# Patient Record
Sex: MALE | Race: AMERICAN INDIAN OR ALASKA NATIVE | URBAN - NONMETROPOLITAN AREA
[De-identification: names, ages, dates, MRNs, and addresses within clinical notes are randomized per-mention and may not be internally consistent; named-entity substitution may affect disease eponyms.]

---

## 2017-01-27 ENCOUNTER — COMMUNICATION - GICH (OUTPATIENT)
Dept: PEDIATRICS | Facility: OTHER | Age: 52
End: 2017-01-27

## 2017-01-27 DIAGNOSIS — M86.272 SUBACUTE OSTEOMYELITIS, LEFT ANKLE AND FOOT (H): ICD-10-CM

## 2017-02-22 ENCOUNTER — COMMUNICATION - GICH (OUTPATIENT)
Dept: PEDIATRICS | Facility: OTHER | Age: 52
End: 2017-02-22

## 2017-02-22 DIAGNOSIS — E11.8 TYPE 2 DIABETES MELLITUS WITH COMPLICATIONS (H): ICD-10-CM

## 2017-02-22 DIAGNOSIS — E11.65 TYPE 2 DIABETES MELLITUS WITH HYPERGLYCEMIA (H): ICD-10-CM

## 2017-03-06 ENCOUNTER — COMMUNICATION - GICH (OUTPATIENT)
Dept: INTERNAL MEDICINE | Facility: OTHER | Age: 52
End: 2017-03-06

## 2017-03-06 DIAGNOSIS — F51.04 PSYCHOPHYSIOLOGIC INSOMNIA: ICD-10-CM

## 2017-03-13 ENCOUNTER — COMMUNICATION - GICH (OUTPATIENT)
Dept: INTERNAL MEDICINE | Facility: OTHER | Age: 52
End: 2017-03-13

## 2017-03-13 DIAGNOSIS — F51.04 PSYCHOPHYSIOLOGIC INSOMNIA: ICD-10-CM

## 2017-03-17 ENCOUNTER — COMMUNICATION - GICH (OUTPATIENT)
Dept: INTERNAL MEDICINE | Facility: OTHER | Age: 52
End: 2017-03-17

## 2017-03-20 ENCOUNTER — COMMUNICATION - GICH (OUTPATIENT)
Dept: INTERNAL MEDICINE | Facility: OTHER | Age: 52
End: 2017-03-20

## 2017-03-20 DIAGNOSIS — F51.04 PSYCHOPHYSIOLOGIC INSOMNIA: ICD-10-CM

## 2017-03-25 ENCOUNTER — COMMUNICATION - GICH (OUTPATIENT)
Dept: PEDIATRICS | Facility: OTHER | Age: 52
End: 2017-03-25

## 2017-03-27 ENCOUNTER — COMMUNICATION - GICH (OUTPATIENT)
Dept: INTERNAL MEDICINE | Facility: OTHER | Age: 52
End: 2017-03-27

## 2017-03-27 DIAGNOSIS — F51.04 PSYCHOPHYSIOLOGIC INSOMNIA: ICD-10-CM

## 2017-03-31 ENCOUNTER — COMMUNICATION - GICH (OUTPATIENT)
Dept: INTERNAL MEDICINE | Facility: OTHER | Age: 52
End: 2017-03-31

## 2017-04-06 ENCOUNTER — COMMUNICATION - GICH (OUTPATIENT)
Dept: INTERNAL MEDICINE | Facility: OTHER | Age: 52
End: 2017-04-06

## 2017-04-06 DIAGNOSIS — F51.04 PSYCHOPHYSIOLOGIC INSOMNIA: ICD-10-CM

## 2017-05-10 ENCOUNTER — COMMUNICATION - GICH (OUTPATIENT)
Dept: INTERNAL MEDICINE | Facility: OTHER | Age: 52
End: 2017-05-10

## 2017-05-10 DIAGNOSIS — M86.272 SUBACUTE OSTEOMYELITIS, LEFT ANKLE AND FOOT (H): ICD-10-CM

## 2017-05-18 ENCOUNTER — COMMUNICATION - GICH (OUTPATIENT)
Dept: PEDIATRICS | Facility: OTHER | Age: 52
End: 2017-05-18

## 2017-05-18 DIAGNOSIS — E11.65 TYPE 2 DIABETES MELLITUS WITH HYPERGLYCEMIA (H): ICD-10-CM

## 2017-05-18 DIAGNOSIS — E11.8 TYPE 2 DIABETES MELLITUS WITH COMPLICATIONS (H): ICD-10-CM

## 2017-05-24 ENCOUNTER — HISTORY (OUTPATIENT)
Dept: PEDIATRICS | Facility: OTHER | Age: 52
End: 2017-05-24

## 2017-05-24 ENCOUNTER — OFFICE VISIT - GICH (OUTPATIENT)
Dept: PEDIATRICS | Facility: OTHER | Age: 52
End: 2017-05-24

## 2017-05-24 DIAGNOSIS — F19.90 OTHER PSYCHOACTIVE SUBSTANCE USE, UNSPECIFIED, UNCOMPLICATED: ICD-10-CM

## 2017-05-24 DIAGNOSIS — M86.272 SUBACUTE OSTEOMYELITIS, LEFT ANKLE AND FOOT (H): ICD-10-CM

## 2017-05-24 DIAGNOSIS — R45.89 OTHER SYMPTOMS AND SIGNS INVOLVING EMOTIONAL STATE: ICD-10-CM

## 2017-05-24 DIAGNOSIS — E11.65 TYPE 2 DIABETES MELLITUS WITH HYPERGLYCEMIA (H): ICD-10-CM

## 2017-05-24 DIAGNOSIS — Z79.4 LONG TERM CURRENT USE OF INSULIN (H): ICD-10-CM

## 2017-05-24 DIAGNOSIS — I10 ESSENTIAL (PRIMARY) HYPERTENSION: ICD-10-CM

## 2017-05-24 DIAGNOSIS — S98.212S: ICD-10-CM

## 2017-05-24 DIAGNOSIS — F51.04 PSYCHOPHYSIOLOGIC INSOMNIA: ICD-10-CM

## 2017-05-24 DIAGNOSIS — E78.2 MIXED HYPERLIPIDEMIA: ICD-10-CM

## 2017-05-24 DIAGNOSIS — F17.200 NICOTINE DEPENDENCE, UNCOMPLICATED: ICD-10-CM

## 2017-05-24 DIAGNOSIS — E11.8 TYPE 2 DIABETES MELLITUS WITH COMPLICATIONS (H): ICD-10-CM

## 2017-05-24 LAB
ANION GAP - HISTORICAL: 6 (ref 5–18)
BUN SERPL-MCNC: 19 MG/DL (ref 7–25)
BUN/CREAT RATIO - HISTORICAL: 22
CALCIUM SERPL-MCNC: 9.4 MG/DL (ref 8.6–10.3)
CHLORIDE SERPLBLD-SCNC: 99 MMOL/L (ref 98–107)
CHOL/HDL RATIO - HISTORICAL: 4.06
CHOLESTEROL TOTAL: 191 MG/DL
CO2 SERPL-SCNC: 25 MMOL/L (ref 21–31)
CREAT SERPL-MCNC: 0.88 MG/DL (ref 0.7–1.3)
ESTIMATED AVERAGE GLUCOSE: 324 MG/DL
GFR IF NOT AFRICAN AMERICAN - HISTORICAL: >60 ML/MIN/1.73M2
GLUCOSE SERPL-MCNC: 329 MG/DL (ref 70–105)
HDLC SERPL-MCNC: 47 MG/DL (ref 23–92)
HEMOGLOBIN A1C MONITORING (POCT) - HISTORICAL: 12.9 % (ref 4–6.2)
LDLC SERPL CALC-MCNC: 116 MG/DL
NON-HDL CHOLESTEROL - HISTORICAL: 144 MG/DL
PATIENT STATUS - HISTORICAL: ABNORMAL
POTASSIUM SERPL-SCNC: 4.6 MMOL/L (ref 3.5–5.1)
SODIUM SERPL-SCNC: 130 MMOL/L (ref 133–143)
TRIGL SERPL-MCNC: 139 MG/DL

## 2017-05-24 ASSESSMENT — PATIENT HEALTH QUESTIONNAIRE - PHQ9: SUM OF ALL RESPONSES TO PHQ QUESTIONS 1-9: 0

## 2017-05-24 ASSESSMENT — ANXIETY QUESTIONNAIRES
1. FEELING NERVOUS, ANXIOUS, OR ON EDGE: NOT AT ALL
GAD7 TOTAL SCORE: 0
3. WORRYING TOO MUCH ABOUT DIFFERENT THINGS: NOT AT ALL
5. BEING SO RESTLESS THAT IT IS HARD TO SIT STILL: NOT AT ALL
7. FEELING AFRAID AS IF SOMETHING AWFUL MIGHT HAPPEN: NOT AT ALL
4. TROUBLE RELAXING: NOT AT ALL
6. BECOMING EASILY ANNOYED OR IRRITABLE: NOT AT ALL
2. NOT BEING ABLE TO STOP OR CONTROL WORRYING: NOT AT ALL

## 2017-05-30 ENCOUNTER — COMMUNICATION - GICH (OUTPATIENT)
Dept: INTERNAL MEDICINE | Facility: OTHER | Age: 52
End: 2017-05-30

## 2017-05-31 ENCOUNTER — AMBULATORY - GICH (OUTPATIENT)
Dept: PEDIATRICS | Facility: OTHER | Age: 52
End: 2017-05-31

## 2017-05-31 LAB
6-MONOACETYL MORPHINE - HISTORICAL: ABNORMAL NG/ML
AMPHETAMINE URINE - HISTORICAL: ABNORMAL NG/ML
BARBITURATE URINE - HISTORICAL: ABNORMAL NG/ML
BENZODIAZEPINE URINE - HISTORICAL: ABNORMAL NG/ML
BUPRENORPHRINE URINE - HISTORICAL: ABNORMAL NG/ML
COCAINE METAB URINE - HISTORICAL: ABNORMAL NG/ML
CREAT UR - HISTORICAL: 68 MG/DL
ETHYLGLUCURONIDE URINE - HISTORICAL: ABNORMAL NG/ML
FENTANYL URINE - HISTORICAL: ABNORMAL NG/ML
MASS SPECTROMETRY URINE - HISTORICAL: ABNORMAL
METHADONE URINE - HISTORICAL: ABNORMAL NG/ML
OPIATES URINE - HISTORICAL: ABNORMAL NG/ML
OXYCODONE URINE - HISTORICAL: ABNORMAL NG/ML
PH URINE - HISTORICAL: 7.3
PROPOXYPHENE URINE - HISTORICAL: ABNORMAL NG/ML
THC 50 URINE - HISTORICAL: ABNORMAL NG/ML
TRAMADOL - HISTORICAL: ABNORMAL NG/ML

## 2017-08-31 ENCOUNTER — COMMUNICATION - GICH (OUTPATIENT)
Dept: INTERNAL MEDICINE | Facility: OTHER | Age: 52
End: 2017-08-31

## 2017-09-02 ENCOUNTER — COMMUNICATION - GICH (OUTPATIENT)
Dept: INTERNAL MEDICINE | Facility: OTHER | Age: 52
End: 2017-09-02

## 2017-09-07 ENCOUNTER — COMMUNICATION - GICH (OUTPATIENT)
Dept: PEDIATRICS | Facility: OTHER | Age: 52
End: 2017-09-07

## 2017-09-07 DIAGNOSIS — E11.65 TYPE 2 DIABETES MELLITUS WITH HYPERGLYCEMIA (H): ICD-10-CM

## 2017-09-07 DIAGNOSIS — Z79.4 LONG TERM CURRENT USE OF INSULIN (H): ICD-10-CM

## 2017-09-07 DIAGNOSIS — E11.8 TYPE 2 DIABETES MELLITUS WITH COMPLICATIONS (H): ICD-10-CM

## 2017-09-13 ENCOUNTER — AMBULATORY - GICH (OUTPATIENT)
Dept: SCHEDULING | Facility: OTHER | Age: 52
End: 2017-09-13

## 2017-09-13 ENCOUNTER — COMMUNICATION - GICH (OUTPATIENT)
Dept: INTERNAL MEDICINE | Facility: OTHER | Age: 52
End: 2017-09-13

## 2017-09-14 ENCOUNTER — COMMUNICATION - GICH (OUTPATIENT)
Dept: INTERNAL MEDICINE | Facility: OTHER | Age: 52
End: 2017-09-14

## 2017-09-15 ENCOUNTER — COMMUNICATION - GICH (OUTPATIENT)
Dept: INTERNAL MEDICINE | Facility: OTHER | Age: 52
End: 2017-09-15

## 2017-09-15 DIAGNOSIS — Z79.4 LONG TERM CURRENT USE OF INSULIN (H): ICD-10-CM

## 2017-09-15 DIAGNOSIS — E11.8 TYPE 2 DIABETES MELLITUS WITH COMPLICATIONS (H): ICD-10-CM

## 2017-09-15 DIAGNOSIS — E11.65 TYPE 2 DIABETES MELLITUS WITH HYPERGLYCEMIA (H): ICD-10-CM

## 2017-09-19 ENCOUNTER — COMMUNICATION - GICH (OUTPATIENT)
Dept: INTERNAL MEDICINE | Facility: OTHER | Age: 52
End: 2017-09-19

## 2017-09-22 ENCOUNTER — AMBULATORY - GICH (OUTPATIENT)
Dept: PEDIATRICS | Facility: OTHER | Age: 52
End: 2017-09-22

## 2017-09-22 DIAGNOSIS — Z79.4 LONG TERM CURRENT USE OF INSULIN (H): ICD-10-CM

## 2017-09-22 DIAGNOSIS — E11.8 TYPE 2 DIABETES MELLITUS WITH COMPLICATIONS (H): ICD-10-CM

## 2017-09-22 DIAGNOSIS — E11.65 TYPE 2 DIABETES MELLITUS WITH HYPERGLYCEMIA (H): ICD-10-CM

## 2017-10-05 ENCOUNTER — COMMUNICATION - GICH (OUTPATIENT)
Dept: INTERNAL MEDICINE | Facility: OTHER | Age: 52
End: 2017-10-05

## 2017-10-05 DIAGNOSIS — I10 ESSENTIAL (PRIMARY) HYPERTENSION: ICD-10-CM

## 2017-10-17 ENCOUNTER — COMMUNICATION - GICH (OUTPATIENT)
Dept: PEDIATRICS | Facility: OTHER | Age: 52
End: 2017-10-17

## 2017-10-17 DIAGNOSIS — E11.8 TYPE 2 DIABETES MELLITUS WITH COMPLICATIONS (H): ICD-10-CM

## 2017-10-17 DIAGNOSIS — E11.65 TYPE 2 DIABETES MELLITUS WITH HYPERGLYCEMIA (H): ICD-10-CM

## 2017-10-17 DIAGNOSIS — Z79.4 LONG TERM CURRENT USE OF INSULIN (H): ICD-10-CM

## 2017-11-15 ENCOUNTER — COMMUNICATION - GICH (OUTPATIENT)
Dept: PEDIATRICS | Facility: OTHER | Age: 52
End: 2017-11-15

## 2017-11-15 DIAGNOSIS — E11.8 TYPE 2 DIABETES MELLITUS WITH COMPLICATIONS (H): ICD-10-CM

## 2017-11-15 DIAGNOSIS — Z79.4 LONG TERM CURRENT USE OF INSULIN (H): ICD-10-CM

## 2017-11-15 DIAGNOSIS — E11.65 TYPE 2 DIABETES MELLITUS WITH HYPERGLYCEMIA (H): ICD-10-CM

## 2017-12-28 NOTE — TELEPHONE ENCOUNTER
Patient Information     Patient Name MRN Jus Diaz [id number] Male 1965      Telephone Encounter by Cindy Barraza RN at 2017  4:28 PM     Author:  Cindy Barraza RN Service:  (none) Author Type:  NURS- Registered Nurse     Filed:  2017  4:35 PM Encounter Date:  2017 Status:  Signed     :  Cindy Barraza RN (NURS- Registered Nurse)            URGENT: Response needed within 24 hours    This timeframe is established by CMS as  best practice  for the delivery of home health care. The home health clinician may need to contact you again if this timeframe is not met.      S:    Medication reconciliation discrepancies and/or drug interactions/contraindications have been identified.  Home Care s drug regime review has revealed significant medication issues.    B:    You are being contacted for orders related to medication issues.     A:     Client admitted to Home Care Program today 2017. Noted following medication interactions:   Major:   Ibuprofen and Aspirin      Medication discrepancies:   1. Patient reports no longer taking Lisinopril and Liptor   2. Patient reports is taking 30ml Nyquil at bedtime for sleep       R:    Please evaluate this information and indicate below whether or not changes are required. A copy of the patient's drug interaction/contraindications report is available upon request.     Thank you for your time. Please call with questions or concerns.       Diane Ramirez RN, Home Care

## 2017-12-28 NOTE — TELEPHONE ENCOUNTER
Patient Information     Patient Name MRJus Vieira [id number] Male 1965      Telephone Encounter by Danyel Castellano MD at 10/17/2017  3:12 PM     Author:  Danyel Castellano MD Service:  (none) Author Type:  Physician     Filed:  10/17/2017  3:12 PM Encounter Date:  10/17/2017 Status:  Signed     :  Danyel Castellano MD (Physician)            Please call Mr. Edwards and ask him to:   -- Schedule lab-only visit for non-fasting labs   -- Schedule diabetes office visit. Bring written blood sugar log book to the visit.    SignedDanyel MD  Internal Medicine [facility] Pediatrics

## 2017-12-28 NOTE — TELEPHONE ENCOUNTER
Patient Information     Patient Name MRJus Vieira [id number] Male 1965      Telephone Encounter by Danyel Castellano MD at 2017  2:07 PM     Author:  Danyel Castellano MD Service:  (none) Author Type:  Physician     Filed:  2017  2:08 PM Encounter Date:  2017 Status:  Signed     :  Danyel Castellano MD (Physician)            Please call Mr. Edwards and ask him to:   -- Schedule lab-only visit for non-fasting labs   -- Schedule diabetes office visit. Bring written blood sugar log book to the visit.    SignedDanyel MD  Internal Medicine [facility] Pediatrics

## 2017-12-28 NOTE — TELEPHONE ENCOUNTER
Patient Information     Patient Name MRN Jus Diaz [id number] Male 1965      Telephone Encounter by Cindy Barraza RN at 9/15/2017  4:09 PM     Author:  Cindy Barraza RN Service:  (none) Author Type:  NURS- Registered Nurse     Filed:  9/15/2017  4:13 PM Encounter Date:  9/15/2017 Status:  Signed     :  Cindy Barraza RN (NURS- Registered Nurse)            URGENT: Response needed within 24 hours    This timeframe is established by CMS as  best practice  for the delivery of home health care. The home health clinician may need to contact you again if this timeframe is not met.      S:    Medication reconciliation discrepancies and/or drug interactions/contraindications have been identified.  Home Care s drug regime review has revealed significant medication issues.    B:    You are being contacted for orders related to medication issues.     A:     I spoke with client today at the visit about your recommendations with re-starting medications. He agreed to resume the Lipitor 40 mg at bedtime, he declined use of Lisinopril as he felt his blood pressure was controlled. Today bp was 110/54. He stated he continues to use Ambien 10 mg po at bedtime as needed for insomnia. He stated he does have some pills left over he only uses as a last resort. No new medication interactions noted.     R:    Please evaluate this information and indicate below whether or not changes are required. A copy of the patient's drug interaction/contraindications report is available upon request.     Thank you for your time. Please call with questions or concerns.   Cindy Barraza RN

## 2017-12-28 NOTE — TELEPHONE ENCOUNTER
Patient Information     Patient Name MRN Jus Diaz [id number] Male 1965      Telephone Encounter by Sandra Matos at 2017  8:40 AM     Author:  Sandra Matos Service:  (none) Author Type:  (none)     Filed:  2017  8:40 AM Encounter Date:  2017 Status:  Signed     :  Sandra Matos            Unable to reach patient has no valid number.  Sandra Matos LPN ....................  2017   8:40 AM

## 2017-12-28 NOTE — TELEPHONE ENCOUNTER
Patient Information     Patient Name MRN Jus Diaz [id number] Male 1965      Telephone Encounter by Danyel Castellano MD at 2017  7:36 AM     Author:  Danyel Castellano MD Service:  (none) Author Type:  Physician     Filed:  2017  7:36 AM Encounter Date:  2017 Status:  Signed     :  Danyel Castellano MD (Physician)            Encourage him to restart aspirin and statin.    Signed, Danyel Castellano MD  Internal Medicine [facility] Pediatrics

## 2017-12-28 NOTE — TELEPHONE ENCOUNTER
Patient Information     Patient Name MRN Sex Jus Krishnamurthy [id number] Male 1965      Telephone Encounter by Elizabeth Ramirez RN at 2017  5:47 PM     Author:  Elizabeth Ramirez RN Service:  (none) Author Type:  NURS- Registered Nurse     Filed:  2017  6:08 PM Encounter Date:  2017 Status:  Signed     :  Elizabeth Ramirez RN (NURS- Registered Nurse)            S: This is Elizabeth Ramirez RN. I am calling from Home Care about Jus Edwards.    1.The problem I am calling about is non-compliance with scheduled nurse visit.    2. Non compliance with wound care    B: Wound vac patient that is not Homebound   1. Patient not home at time of scheduled Homecare nurse visit   2. No notification to Homecare  About disconnection of wound vac x3 days    A: At skilled nurse visit, wound was found to be greater in size with strong odor. Wound vac dressing replaced at that visit.    R: I request appointment with PCP and/or wound care nurse for further evaluation of wound.     Thank you,     Elizabeth Ramirez

## 2017-12-28 NOTE — TELEPHONE ENCOUNTER
Patient Information     Patient Name MRN Jus Diaz [id number] Male 1965      Telephone Encounter by Danyel Castellano MD at 2017  1:20 PM     Author:  Danyel Castellano MD Service:  (none) Author Type:  Physician     Filed:  2017  1:20 PM Encounter Date:  2017 Status:  Signed     :  Danyel Castellano MD (Physician)            Ok.    SignedDanyel MD  Internal Medicine [facility] Pediatrics

## 2017-12-28 NOTE — TELEPHONE ENCOUNTER
Patient Information     Patient Name MRN Jus Diaz [id number] Male 1965      Telephone Encounter by Danyel Castellano MD at 2017  9:45 AM     Author:  Danyel Castellano MD Service:  (none) Author Type:  Physician     Filed:  2017  9:45 AM Encounter Date:  2017 Status:  Signed     :  Danyel Castellano MD (Physician)            Noted, agree.    Signed, Danyel Castellano MD  Internal Medicine [facility] Pediatrics

## 2017-12-28 NOTE — TELEPHONE ENCOUNTER
Patient Information     Patient Name MRN Jus Diaz [id number] Male 1965      Telephone Encounter by Sandra Matos at 10/17/2017  3:17 PM     Author:  Sandra Matos Service:  (none) Author Type:  (none)     Filed:  10/17/2017  3:18 PM Encounter Date:  10/17/2017 Status:  Signed     :  Sandra Matos            Patient notified and made appointment on 10/26.  Sandra Matos LPN ....................  10/17/2017   3:18 PM

## 2017-12-28 NOTE — TELEPHONE ENCOUNTER
Patient Information     Patient Name MRN Jus Diaz [id number] Male 1965      Telephone Encounter by Orquidea Valdez at 2017  2:41 PM     Author:  Orquidea Valdez Service:  (none) Author Type:  (none)     Filed:  2017  2:42 PM Encounter Date:  2017 Status:  Signed     :  Orquidea Valdez            Unable to leave a message, number has been changed or disconnected.  Orquidea Valdez LPN......................2017 2:41 PM

## 2017-12-28 NOTE — TELEPHONE ENCOUNTER
Patient Information     Patient Name MRN Jus Diaz [id number] Male 1965      Telephone Encounter by Sandra Matos at 11/15/2017  9:47 AM     Author:  Sandra Matos Service:  (none) Author Type:  (none)     Filed:  11/15/2017  9:48 AM Encounter Date:  11/15/2017 Status:  Signed     :  Sandra Matos            Patient answered and states that he is sorry he missed his last appointment.  Scheduled for Monday at 9:30.  Sandar Matos LPN ....................  11/15/2017   9:47 AM

## 2017-12-28 NOTE — TELEPHONE ENCOUNTER
Patient Information     Patient Name MRN Jus Diaz [id number] Male 1965      Telephone Encounter by Cindy Barraza RN at 2017 11:28 AM     Author:  Cindy Barraza RN Service:  (none) Author Type:  NURS- Registered Nurse     Filed:  2017 11:34 AM Encounter Date:  2017 Status:  Signed     :  Cindy Barraza RN (NURS- Registered Nurse)            FYI:    Dr Castellano,     Just wanted to inform you about several unsuccessful attempts to make a plan for wound vac management with Home Care services. Process started on 17, client was not home, completing errands, no electricity in the home and had to pay his bill and no return calls by client to make a definitive plan for admission. Today, 17, this nurse called twice and the first call they hung up and second attempt left a message with no return call. I informed [employer], (wound vac supplier) about the above information as well as podiatrist Dr Michael Ponce from [facility].     Thank you for your time,   Cindy Barraza RN

## 2017-12-28 NOTE — TELEPHONE ENCOUNTER
Patient Information     Patient Name MRN Jus Diaz [id number] Male 1965      Telephone Encounter by Sandra Matos at 10/9/2017  2:25 PM     Author:  Sandra Matos Service:  (none) Author Type:  (none)     Filed:  10/9/2017  2:25 PM Encounter Date:  10/5/2017 Status:  Signed     :  Sandra Matos            Lisinopril 2.5 mg was ordered on 10/5/17.  Sandra Matos LPN ....................  10/9/2017   2:25 PM

## 2017-12-28 NOTE — TELEPHONE ENCOUNTER
Patient Information     Patient Name MRN Sex Jus Krishnamurthy [id number] Male 1965      Telephone Encounter by Ana Campbell RN at 2017  8:25 AM     Author:  nAa Campbell RN Service:  (none) Author Type:  NURS- Registered Nurse     Filed:  2017  8:44 AM Encounter Date:  9/15/2017 Status:  Signed     :  Ana Campbell RN (NURS- Registered Nurse)            Per chart letter was sent to patient by Danyel Castellano MD on 17 after labs showed high A1C of 12.9, and patient was asked to come into for office follow-up in 1 to 2 weeks. No follow-up appointment was ever made. Per chart patient has been difficult to reach by phone and with home care nurse. Routed to Danyel Castellano MD for consideration of request.   Unable to complete prescription refill per RN Medication Refill Policy.................... Aan Campbell RN ....................  2017   8:43 AM      Refill request from: [facility]    Medication:insulin aspart (NOVOLOG FLEXPEN) 100 unit/mL solution for injection    Qty:5 pen       Ref:5  Start:2017   End:              Route:                      TOPHER:No   Class:eRx    Si units with breakfast, 30 units with lunch and 28 units with dinner.    Note to Pharmacy:Correction: 150-200 2 units, 201-250 4 units, 251-300 6 units, 301-350 8 units, 351-400 10 units, over 401 call MD    Quantity requested: 15 pens X 4 refills  Last fill date: 17   Due for refill: Yes  Last visit with DANYEL CASTELLANO was on: 2017 in [facility]  PCP:  Danyel Castellano MD  Controlled Substance Agreement:  n/a   Diagnosis r/t this medication request: Uncontrolled diabetes type 2

## 2017-12-28 NOTE — TELEPHONE ENCOUNTER
Patient Information     Patient Name MRN Sex Jus Krishnamurthy [id number] Male 1965      Telephone Encounter by Danyel Castellano MD at 2017  7:52 AM     Author:  Danyel Castellano MD Service:  (none) Author Type:  Physician     Filed:  2017  7:52 AM Encounter Date:  9/15/2017 Status:  Signed     :  Danyel Castellano MD (Physician)            Noted. Lisinopril should be continued for kidney protection, even if blood pressure is normal.  Lowest dose is effective.    Signed, Danyel Castellano MD  Internal Medicine [facility] Pediatrics

## 2017-12-28 NOTE — TELEPHONE ENCOUNTER
Patient Information     Patient Name MRN Sex Jus Krishnamurthy [id number] Male 1965      Telephone Encounter by Halie Ignacio at 2017 11:05 AM     Author:  Halie Ignacio Service:  (none) Author Type:  (none)     Filed:  2017 11:09 AM Encounter Date:  2017 Status:  Signed     :  Halie Ignacio            Request for Home Care Skilled Nursing orders as follows:    SN eval and treat date: 17, next skilled nurse visit 9/15/17    Then:  Continuation frequency =  ____3___ X week X ____6___ weeks             Effective date= 17    Interventions include:      Assessment  Patient/caregiver education  Diabetic: instruct on foot care and monitor lower extremities for presence of skin lesions.  Fall risk: instruct on fall prevention strategies, home safety, assess environment  Instruct on pain relief measures and assess pain with each visit, if has pain. Assess effectiveness of medications.    Wound care as follows:  DX:Diabetic Ulcer bottom of left foot  TX: Wound VAC dressing change 3x/week and PRN

## 2017-12-28 NOTE — TELEPHONE ENCOUNTER
Patient Information     Patient Name MRJus Vieira [id number] Male 1965      Telephone Encounter by Danyel Castellano MD at 11/15/2017  9:34 AM     Author:  Danyel Castellano MD Service:  (none) Author Type:  Physician     Filed:  11/15/2017  9:34 AM Encounter Date:  11/15/2017 Status:  Signed     :  Danyel Castellano MD (Physician)            Please call Mr. Edwards and ask him to:   -- Schedule lab-only visit for non-fasting labs   -- Schedule diabetes office visit. Bring written blood sugar log book to the visit.    SignedDanyel MD  Internal Medicine [facility] Pediatrics

## 2017-12-28 NOTE — TELEPHONE ENCOUNTER
Patient Information     Patient Name MRN Jus Diaz [id number] Male 1965      Telephone Encounter by Danielle Roldan at 10/5/2017  9:00 AM     Author:  Danielle Roldan Service:  (none) Author Type:  (none)     Filed:  10/5/2017  9:22 AM Encounter Date:  10/5/2017 Status:  Signed     :  Danielle Roldan            URGENT: Response needed within 24 hours    This timeframe is established by CMS as  best practice  for the delivery of home health care. The home health clinician may need to contact you again if this timeframe is not met.      S:    Medication reconciliation discrepancies and/or drug interactions/contraindications have been identified.  Home Care s drug regime review has revealed significant medication issues.    B:    You are being contacted for orders related to medication issues.     A: Patient has agreed to restart Lisinopril 5 mg daily, which is current order. Would you like him to continue on this dose or would you like him to be on lowest dose 2.5 mg? B/P's have been stable. 120's/70's.    Note following medication interactions below.       MODERATE: Lisinopril, Aspirin EC                         Ibuprofen, Lisinopril               R:    Please evaluate this information and indicate below whether or not changes are required. A copy of the patient's drug interaction/contraindications report is available upon request.     Thank you for your time. Please call with questions or concerns.       Danielle Roldan LPN

## 2017-12-28 NOTE — TELEPHONE ENCOUNTER
Patient Information     Patient Name MRN Sex Jus Krishnamurthy [id number] Male 1965      Telephone Encounter by Elisabeth Brown CNA at 2017  9:51 AM     Author:  Elisabeth Brown CNA Service:  (none) Author Type:  NURS- Nurse Assist/Care Tech     Filed:  2017  9:52 AM Encounter Date:  2017 Status:  Signed     :  Elisabeth Brown CNA (NURS- Nurse Assist/Care Tech)            A referral from Dr. Ponce was received for patient for left foot ulcer wound care and wound vac services. Dr. Ponce, Podiatry has ordered a wound vac for this patient and planning on following the patients progress. We are planning on admitting patient tomorrow for 3 times a week for wound care. Patient developed a left foot ulcer 3.5cm x 2.5 cm after debridement. This is just a FYI message and no response is needed. Thank you.    Kiersten Brown LPN  Clinical Coordinator

## 2018-01-03 NOTE — TELEPHONE ENCOUNTER
Patient Information     Patient Name MRN Sex Jus Krishnamurthy [id number] Male 1965      Telephone Encounter by Gorge Mosher RN at 3/17/2017 11:24 AM     Author:  Gorge Mosher RN Service:  (none) Author Type:  NURS- Registered Nurse     Filed:  3/17/2017 11:27 AM Encounter Date:  3/17/2017 Status:  Signed     :  Gorge Mosher RN (NURS- Registered Nurse)            Patient was called today regarding BLOOD PRESSURE AND DIABETIC FOLLOW UP. Per [facility] Measures, it is recommended that patient complete APPOINTMENT AND LABS. Unable to reach patient at this time, will attempt to call again.   GORGE MOSHER RN............. 3/17/2017 11:26 AM

## 2018-01-03 NOTE — TELEPHONE ENCOUNTER
Patient Information     Patient Name MRN Sex Jus Krishnamurthy [id number] Male 1965      Telephone Encounter by Ana Betancourt RN at 3/7/2017  8:00 AM     Author:  Ana Betancourt RN Service:  (none) Author Type:  NURS- Registered Nurse     Filed:  3/7/2017  8:04 AM Encounter Date:  3/6/2017 Status:  Signed     :  Ana Betancourt RN (NURS- Registered Nurse)            This is a Refill request from: [facility] pharmacy  Name of Medication: Zolpidem  Quantity requested: 90  Last fill date: 16  Last visit with SHARONDA BOOKER was on: No past appointments listed with this provider  PCP:  Danyel Castellano MD last OV 10/26/16  Controlled Substance Agreement:  None found   Diagnosis r/t this medication request: chronic insomnia     Unable to complete prescription refill per RN Medication Refill Policy.................... Ana Betancourt RN ....................  3/7/2017   8:01 AM

## 2018-01-03 NOTE — TELEPHONE ENCOUNTER
Patient Information     Patient Name MRN Jus Diaz [id number] Male 1965      Telephone Encounter by Radha Pruitt RN at 3/14/2017  8:32 AM     Author:  Radha Pruitt RN Service:  (none) Author Type:  NURS- Registered Nurse     Filed:  3/14/2017  8:35 AM Encounter Date:  3/13/2017 Status:  Signed     :  Radha Pruitt RN (NURS- Registered Nurse)            zolpidem (AMBIEN) 10 mg tablet  TAKE 1 TABLET BY MOUTH ONCE DAILY AT BEDTIME       Disp: 90 tablet Refills:     Class: eRx Start: 3/13/2017    For: Chronic insomnia  Documented:10 months ago  Last refill: 2016  To be filled at: [facility] [place] [address] [address] [phone]      Last visit with Danyel Castellano MD  was on: 10/26/16  PCP:  Danyel Castellano MD  Controlled Substance Agreement:  None noted      Unable to complete prescription refill per RN Medication Refill Policy.................... RADHA PRUITT RN ....................  3/14/2017   8:33 AM

## 2018-01-03 NOTE — TELEPHONE ENCOUNTER
Patient Information     Patient Name MRJus Vieira [id number] Male 1965      Telephone Encounter by Danyel Castellano MD at 2017  9:41 AM     Author:  Danyel Castellano MD Service:  (none) Author Type:  Physician     Filed:  2017  9:41 AM Encounter Date:  2017 Status:  Signed     :  Danyel Castellano MD (Physician)            Please call Mr. Edwards and ask him to:   -- Schedule lab-only visit for non-fasting labs   -- Schedule diabetes office visit. Bring written blood sugar log book to the visit.    SignedDanyel MD  Internal Medicine & Pediatrics

## 2018-01-03 NOTE — TELEPHONE ENCOUNTER
Patient Information     Patient Name MRN Sex Jus Krishnamurthy [id number] Male 1965      Telephone Encounter by Sanchez De La Rosa LPN at 2017 11:24 AM     Author:  Sanchez De La Rosa LPN Service:  (none) Author Type:  NURS- Licensed Practical Nurse     Filed:  2017 11:25 AM Encounter Date:  2017 Status:  Signed     :  Sanchez De La Rosa LPN (NURS- Licensed Practical Nurse)            Called and talked to patient and he was in the hospital with his son and would call and schedule his appointments when he could.  Sanchez De La Rosa LPN ..............2017 11:25 AM

## 2018-01-03 NOTE — TELEPHONE ENCOUNTER
Patient Information     Patient Name MRN Sex Jus Krishnamurthy [id number] Male 1965      Telephone Encounter by Gorge Mosher RN at 3/21/2017  8:07 AM     Author:  Gorge Mosher RN Service:  (none) Author Type:  NURS- Registered Nurse     Filed:  3/21/2017  8:09 AM Encounter Date:  3/20/2017 Status:  Signed     :  Gorge Mosher RN (NURS- Registered Nurse)            Medication is not on refill protocol. Unable to complete prescription refill per RN Medication Refill Policy.................... GORGE MOSHER RN ....................  3/21/2017   8:08 AM        This is a Refill request from: [facility]  Name of Medication: Ambien 10mg  Quantity requested: 90  Last fill date: 16  Last visit with SHARONDA BOOKER was on: No past appointments listed with this provider  PCP:  Danyel Castellano MD  Controlled Substance Agreement:  none   Diagnosis r/t this medication request: insomnia

## 2018-01-04 NOTE — TELEPHONE ENCOUNTER
Patient Information     Patient Name MRN Sex Jus Krishnamurthy [id number] Male 1965      Telephone Encounter by Gorge Mosher RN at 3/31/2017 10:13 AM     Author:  Gorge Mosher RN Service:  (none) Author Type:  NURS- Registered Nurse     Filed:  3/31/2017 10:16 AM Encounter Date:  3/31/2017 Status:  Signed     :  Gorge Mosher RN (NURS- Registered Nurse)            Call from switchboard received, line went dead after attempted transfer.  Attempt to call patient back with no answer.  GORGE MOSHER RN ....................  3/31/2017   10:15 AM

## 2018-01-04 NOTE — TELEPHONE ENCOUNTER
Patient Information     Patient Name MRN Jus Diaz [id number] Male 1965      Telephone Encounter by Sandra Matos at 3/29/2017  9:14 AM     Author:  Sandra Matos Service:  (none) Author Type:  (none)     Filed:  3/29/2017  9:15 AM Encounter Date:  3/25/2017 Status:  Signed     :  Sandra Matos            Multiple messages left for patient with no return call.  Letter sent.  Sandra Matos LPN ....................  3/29/2017   9:15 AM

## 2018-01-04 NOTE — TELEPHONE ENCOUNTER
Patient Information     Patient Name MRJus Vieira [id number] Male 1965      Telephone Encounter by Danyel Castellano MD at 3/25/2017  2:29 PM     Author:  Danyel Castellano MD Service:  (none) Author Type:  Physician     Filed:  3/25/2017  2:30 PM Encounter Date:  3/25/2017 Status:  Signed     :  Danyel Castellano MD (Physician)            Please call Mr. Edwards and ask him to:   -- Schedule lab-only visit for fasting labs   -- Schedule diabetes office visit. Bring written blood sugar log book to the visit.    Signed, Danyel Castellano MD  Internal Medicine [facility] Pediatrics

## 2018-01-04 NOTE — TELEPHONE ENCOUNTER
Patient Information     Patient Name MRN Sex Jus Krishnamurthy [id number] Male 1965      Telephone Encounter by Radha Pruitt RN at 2017  9:24 AM     Author:  Radha Pruitt RN Service:  (none) Author Type:  NURS- Registered Nurse     Filed:  2017  9:31 AM Encounter Date:  2017 Status:  Signed     :  Radha Pruitt RN (NURS- Registered Nurse)            Medication has been denied by Dr. Castellano.  Patient needs appointment  Also per phone note on 3/25 patient needs lab only appointment then diabetic OV.  Spoke with patient and informed him per Dr. Castellano note and patient transferred to scheduling.  Patient states he is not requesting the refill he thinks maybe its his PCA, but states he has medications.  Call and informed pharmacy  RX denied.  Unable to complete prescription refill per RN Medication Refill Policy.................... RADHA PRUITT, ROSALIA ....................  2017   9:30 AM

## 2018-01-04 NOTE — TELEPHONE ENCOUNTER
Patient Information     Patient Name MRN Jus Diaz [id number] Male 1965      Telephone Encounter by Sandra Matos at 3/27/2017  9:26 AM     Author:  Sandra Matos Service:  (none) Author Type:  (none)     Filed:  3/27/2017  9:26 AM Encounter Date:  3/25/2017 Status:  Signed     :  Sandra Matos            Left message to call back  ..................Sandra Matos LPN......3/27/2017 9:26 AM

## 2018-01-04 NOTE — TELEPHONE ENCOUNTER
Patient Information     Patient Name MRN Sex Jus Krishnamurthy [id number] Male 1965      Telephone Encounter by Radha Pruitt RN at 3/28/2017  8:59 AM     Author:  Radha Pruitt RN Service:  (none) Author Type:  NURS- Registered Nurse     Filed:  3/28/2017  9:04 AM Encounter Date:  3/27/2017 Status:  Signed     :  Radha Pruitt RN (NURS- Registered Nurse)            Medication has been denied by Dr. Castellano.  Patient needs appointment  Also per phone note on 3/25 patient needs lab only appointment then diabetic OV.  Left message for patient to return call.    RADHA PRUITT RN ....................  3/28/2017   9:04 AM

## 2018-01-04 NOTE — TELEPHONE ENCOUNTER
Patient Information     Patient Name MRN Jsu Diaz [id number] Male 1965      Telephone Encounter by Sandra Matos at 3/28/2017  9:23 AM     Author:  Sandra Matos Service:  (none) Author Type:  (none)     Filed:  3/28/2017  9:23 AM Encounter Date:  3/25/2017 Status:  Signed     :  Sandra Matos            Left message to call back  ..................Sandra Matos LPN......3/28/2017 9:23 AM

## 2018-01-05 NOTE — PATIENT INSTRUCTIONS
Patient Information     Patient Name MRJus Vieira [id number] Male 1965      Patient Instructions by Danyel Castellano MD at 2017  9:30 AM     Author:  Danyel Castellano MD  Service:  (none) Author Type:  Physician     Filed:  2017  9:54 AM  Encounter Date:  2017 Status:  Addendum     :  Danyel Castellano MD (Physician)        Related Notes: Original Note by Danyel Castellano MD (Physician) filed at 2017  9:51 AM            Aspects of Diabetes we can improve:  Hemoglobin A1c Lab Results   Component Value Date/Time    HGBA1C 11.6 (H) 10/26/2016 11:34 AM    HGBA1C 9.6 (H) 2011 12:10 PM    Goal range is under 8. Best is 6.5 to 7   Blood Pressure BP Readings from Last 1 Encounters:   17 138/82    Goal to keep less than 140/90   Tobacco  reports that he has been smoking Cigarettes.  He started smoking about 22 years ago. He has a 15.00 pack-year smoking history. He has never used smokeless tobacco. Goal to abstain from tobacco   Aspirin Continue aspirin 81 mg Aspirin reduces risk of heart disease and stroke   ACE/ARB Restart lisinopril These medications reduce risk of kidney disease   Cholesterol Continue Lipitor Statins reduce risk of heart disease and stroke   Eye Exam DUE Annual diabetic eye exam   Healthy weight Body mass index is 27.8 kg/(m^2). Goal BMI under 30, best is under 25.      -- I'm trying to exercise daily (goal at least 20 min/day) with moderate aerobic activity   -- Eat healthy (resources from ADA at http://www.diabetes.org/)   -- I'm taking good care of my feet. Consider seeing the Podiatrist   -- Check blood sugars as directed, record in log book and bring to every appointment   -- See Dr. Paulino (podiatry) ask him to send notes to Danyel Castellano MD    -- Schedule an eye exam   -- Next diabetes lab draw: 3 months   -- Next diabetes office visit: 1 month

## 2018-01-05 NOTE — TELEPHONE ENCOUNTER
Patient Information     Patient Name MRJus Vieira [id number] Male 1965      Telephone Encounter by Danyel Castellano MD at 2017 12:03 PM     Author:  Danyel Castellano MD Service:  (none) Author Type:  Physician     Filed:  2017 12:04 PM Encounter Date:  2017 Status:  Signed     :  Danyel Castellano MD (Physician)            Please call Mr. Edwards and ask him to:   -- Schedule lab-only visit for fasting labs   -- Schedule diabetes office visit. Bring written blood sugar log book to the visit.    Signed, Danyel Castellano MD  Internal Medicine & Pediatrics

## 2018-01-05 NOTE — TELEPHONE ENCOUNTER
"Patient Information     Patient Name MRN Sex Jus Krishnamurthy [id number] Male 1965      Telephone Encounter by Radha Mccartney RN at 5/10/2017  3:32 PM     Author:  Radha Mccartney RN Service:  (none) Author Type:  NURS- Registered Nurse     Filed:  5/10/2017  3:37 PM Encounter Date:  5/10/2017 Status:  Signed     :  Radha Mccartney RN (NURS- Registered Nurse)            ibuprofen (ADVIL; MOTRIN) 800 mg tablet  TAKE ONE TABLET ORALLY EVERY EIGHT HOURS WITH FOOD AS NEEDED FOR PAIN  Disp: 90 tablet Refills: 2    Class: eRx Start: 5/10/2017    For: Subacute osteomyelitis of left foot (HC)  Documented:12 months ago  Last refill:2017  To be filled at: [address]Phone: [phone]    Last visit with DANYEL CASTELLANO was on: 10/26/2016 in [facility]  Next visit with DANYEL CASTELLANO is on: No future appointment listed with this provider  Next visit with Internal Medicine is on: No future appointment listed in this department    Lab test requirements:  Annual creatinine.  CREATININE (mg/dL)    Date Value   10/26/2016 0.80     Will route to Danyel Castellano MD for review and consideration of refill due to patient has had multiple \"no show\" appointments    Unable to complete prescription refill per RN Medication Refill Policy.................... RADHA MCCARTNEY RN ....................  5/10/2017   3:35 PM                "

## 2018-01-05 NOTE — TELEPHONE ENCOUNTER
Patient Information     Patient Name MRN Jus Diaz [id number] Male 1965      Telephone Encounter by Sandra Matos at 2017  3:08 PM     Author:  Sandra Matos Service:  (none) Author Type:  (none)     Filed:  2017  3:55 PM Encounter Date:  2017 Status:  Signed     :  Sandra Matos            Left message to call back  ..................Sandra Matos LPN......2017 3:55 PM

## 2018-01-05 NOTE — PROGRESS NOTES
Patient Information     Patient Name MRN Jus Diaz [id number] Male 1965      Progress Notes by Danyel Castellano MD at 2017  9:30 AM     Author:  Danyel Castellano MD Service:  (none) Author Type:  Physician     Filed:  2017 10:02 AM Encounter Date:  2017 Status:  Signed     :  Danyel Castellano MD (Physician)            Subjective  Jus Edwards is a 52 y.o. male who presents for diabetes follow-up. He continues on Lantus 30 units daily at bedtime, NovoLog . He hasn't been checking any blood glucoses for a long time. One of his kids broke his glucometer. He continues on aspirin prophylaxis and Lipitor for the treatment of hyperlipidemia. He stopped taking his lisinopril because he just decided to stop it. He takes the Ambien each night so he can sleep but he ran out a while back. He's been seeing a Dr. Ponce, a podiatrist in [place] for his diabetic foot ulcer which is finally almost healing. This is status post diabetic foot ulcer complicated by MRSA cellulitis and osteomyelitis. He's not been having any chest pains, palpitations, diaphoresis or dyspnea.    Allergies: reviewed in EMR  Medications: reviewed in EMR  Problem List/PMH: reviewed in EMR    Social Hx:  Social History      Substance Use Topics        Smoking status:  Current Every Day Smoker     Packs/day: 0.50     Years: 30.00     Types: Cigarettes     Start date: 1995     Smokeless tobacco:  Never Used     Alcohol use  No     Social History Narrative    ; [facility], lives in a house in [place].  3 boys, one daughter.  Dr Godoy, [facility].          I reviewed social history and made relevant updates today.    Family Hx:   Family History       Problem   Relation Age of Onset     Diabetes  Mother      Diabetes  Father      Hypertension  Father      Diabetes  Brother      amputations & renal failure       Diabetes  Sister      dialysis       Heart Disease  Brother        "65         Objective  Vitals: reviewed in EMR.  /82  Pulse 72  Ht 1.905 m (6' 3\")  Wt 100.9 kg (222 lb 6.4 oz)  BMI 27.8 kg/m2    Gen: Seems a little uneasy, fidgeting a lot.  HEENT: MMM  Neck: Supple  cardiovascular: Regular rate and rhythm, no murmurs rubs or gallops.  Pulm: Clear to auscultation bilaterally.  Neuro: Grossly intact  Skin: No concerning lesions.  Psychiatric: Normal affect and insight. Does not appear anxious or depressed.    Diabetes Labs  Lab Results      Component  Value Date/Time    HGBA1C 11.6 (H) 10/26/2016 11:34 AM    HGBA1C 9.6 (H) 06/27/2011 12:10 PM    CHOL 121 06/21/2016 01:35 PM    HDL 32 06/21/2016 01:35 PM    LDLCHOL 63 06/21/2016 01:35 PM    TRIGLYCERIDE 132 06/21/2016 01:35 PM    CREATININE 0.80 10/26/2016 11:34 AM       Assessment    ICD-10-CM    1. Uncontrolled type 2 diabetes mellitus with complication, with long-term current use of insulin (HC) E11.8 insulin aspart (NOVOLOG FLEXPEN) 100 unit/mL solution for injection     E11.65 insulin glargine (LANTUS SOLOSTAR) 100 unit/mL (3 mL) pen     Z79.4 HEMOGLOBIN A1C MONITORING (POCT)      blood-glucose meter      blood sugar diagnostic (BLOOD GLUCOSE TEST) strip      DRUG SCREEN - PAIN MANAGEMENT - TOXASSURE   2. HYPERTENSION I10 lisinopril (PRINIVIL; ZESTRIL) 5 mg tablet      BASIC METABOLIC PANEL   3. Mixed hyperlipidemia E78.2 atorvastatin (LIPITOR) 40 mg tablet      LIPID PANEL   4. Subacute osteomyelitis of left foot (HC) M86.272 ibuprofen (ADVIL; MOTRIN) 800 mg tablet   5. Chronic insomnia F51.04 zolpidem (AMBIEN) 10 mg tablet   6. s/p amputation of all toes bilaterally S98.212S    7. Tobacco use disorder F17.200    8. Fidgeting R45.89 DRUG SCREEN - PAIN MANAGEMENT - TOXASSURE   9. Illicit drug use F19.90 DRUG SCREEN - PAIN MANAGEMENT - TOXASSURE     he denies any illicit substance use, so we are obtaining Toxasure today. If positive for illicit substances would no longer prescribe Ambien.    Strongly encouraged close " follow-up, ongoing follow-up for medical management of diabetes mellitus. I would like to see him back in 2-4 weeks for an office visit to discuss his lab work, review his home glucose log that he will be obtaining for me.    Plan  Patient Instructions     Aspects of Diabetes we can improve:  Hemoglobin A1c Lab Results   Component Value Date/Time    HGBA1C 11.6 (H) 10/26/2016 11:34 AM    HGBA1C 9.6 (H) 06/27/2011 12:10 PM    Goal range is under 8. Best is 6.5 to 7   Blood Pressure BP Readings from Last 1 Encounters:   05/24/17 138/82    Goal to keep less than 140/90   Tobacco  reports that he has been smoking Cigarettes.  He started smoking about 22 years ago. He has a 15.00 pack-year smoking history. He has never used smokeless tobacco. Goal to abstain from tobacco   Aspirin Continue aspirin 81 mg Aspirin reduces risk of heart disease and stroke   ACE/ARB Restart lisinopril These medications reduce risk of kidney disease   Cholesterol Continue Lipitor Statins reduce risk of heart disease and stroke   Eye Exam DUE Annual diabetic eye exam   Healthy weight Body mass index is 27.8 kg/(m^2). Goal BMI under 30, best is under 25.      -- I'm trying to exercise daily (goal at least 20 min/day) with moderate aerobic activity   -- Eat healthy (resources from ADA at http://www.diabetes.org/)   -- I'm taking good care of my feet. Consider seeing the Podiatrist   -- Check blood sugars as directed, record in log book and bring to every appointment   -- See Dr. Paulino (podiatry) ask him to send notes to Danyel Castellano MD    -- Schedule an eye exam   -- Next diabetes lab draw: 3 months   -- Next diabetes office visit: 1 month        Signed, Danyel Castellano MD  Internal Medicine & Pediatrics

## 2018-01-05 NOTE — TELEPHONE ENCOUNTER
Patient Information     Patient Name MRN Jus Diaz [id number] Male 1965      Telephone Encounter by Sandra Matos at 2017 12:58 PM     Author:  Sandra Matos Service:  (none) Author Type:  (none)     Filed:  2017 12:58 PM Encounter Date:  2017 Status:  Signed     :  Sandra Matos            Left message to call back  ..................Sandra Matos LPN......2017 12:58 PM

## 2018-01-05 NOTE — NURSING NOTE
Patient Information     Patient Name MRN Jus Diaz [id number] Male 1965      Nursing Note by Sandra Matos at 2017  9:30 AM     Author:  Sandra Matos Service:  (none) Author Type:  (none)     Filed:  2017  9:47 AM Encounter Date:  2017 Status:  Signed     :  Sandra Matos            Patient presents to clinic for diabetic check and medication review.  Sandra Matos LPN ....................  2017   9:34 AM

## 2018-01-19 ENCOUNTER — COMMUNICATION - GICH (OUTPATIENT)
Dept: PEDIATRICS | Facility: OTHER | Age: 53
End: 2018-01-19

## 2018-01-19 DIAGNOSIS — E11.8 TYPE 2 DIABETES MELLITUS WITH COMPLICATIONS (H): ICD-10-CM

## 2018-01-19 DIAGNOSIS — E11.65 TYPE 2 DIABETES MELLITUS WITH HYPERGLYCEMIA (H): ICD-10-CM

## 2018-01-19 DIAGNOSIS — Z79.4 LONG TERM CURRENT USE OF INSULIN (H): ICD-10-CM

## 2018-01-19 PROBLEM — L60.0 ONYCHOCRYPTOSIS: Status: ACTIVE | Noted: 2018-01-19

## 2018-01-19 PROBLEM — E78.5 HYPERLIPIDEMIA: Status: ACTIVE | Noted: 2018-01-19

## 2018-01-19 PROBLEM — M54.50 LUMBAGO: Status: ACTIVE | Noted: 2018-01-19

## 2018-01-19 PROBLEM — B35.1 ONYCHOMYCOSIS: Status: ACTIVE | Noted: 2018-01-19

## 2018-01-19 PROBLEM — F17.200 TOBACCO USE DISORDER: Status: ACTIVE | Noted: 2017-05-24

## 2018-01-19 PROBLEM — L85.1 ACQUIRED KERATODERMA: Status: ACTIVE | Noted: 2018-01-19

## 2018-01-19 RX ORDER — CALCIUM CARB/VITAMIN D3/VIT K1 500-100-40
TABLET,CHEWABLE ORAL
COMMUNITY

## 2018-01-19 RX ORDER — ASPIRIN 81 MG/1
81 TABLET ORAL
COMMUNITY
Start: 2015-09-08

## 2018-01-19 RX ORDER — BLOOD-GLUCOSE METER
EACH MISCELLANEOUS
COMMUNITY
Start: 2015-09-08

## 2018-01-19 RX ORDER — IBUPROFEN 800 MG/1
800 TABLET, FILM COATED ORAL
COMMUNITY
Start: 2017-05-24

## 2018-01-19 RX ORDER — LANCETS
EACH MISCELLANEOUS
COMMUNITY
Start: 2016-06-01

## 2018-01-19 RX ORDER — INSULIN PUMP SYRINGE, 3 ML
EACH MISCELLANEOUS
COMMUNITY
Start: 2017-05-24

## 2018-01-19 RX ORDER — AMOXICILLIN 250 MG
CAPSULE ORAL
COMMUNITY
Start: 2017-11-15

## 2018-01-19 RX ORDER — BUPRENORPHINE AND NALOXONE 8; 2 MG/1; MG/1
FILM, SOLUBLE BUCCAL; SUBLINGUAL
COMMUNITY
Start: 2017-12-21

## 2018-01-19 RX ORDER — ATORVASTATIN CALCIUM 40 MG/1
40 TABLET, FILM COATED ORAL
COMMUNITY
Start: 2017-05-24

## 2018-01-19 RX ORDER — LISINOPRIL 2.5 MG/1
2.5 TABLET ORAL
COMMUNITY
Start: 2017-10-05

## 2018-01-27 VITALS
WEIGHT: 222.4 LBS | HEART RATE: 72 BPM | HEIGHT: 75 IN | DIASTOLIC BLOOD PRESSURE: 82 MMHG | BODY MASS INDEX: 27.65 KG/M2 | SYSTOLIC BLOOD PRESSURE: 138 MMHG

## 2018-01-29 ENCOUNTER — COMMUNICATION - GICH (OUTPATIENT)
Dept: PEDIATRICS | Facility: OTHER | Age: 53
End: 2018-01-29

## 2018-01-30 ASSESSMENT — PATIENT HEALTH QUESTIONNAIRE - PHQ9: SUM OF ALL RESPONSES TO PHQ QUESTIONS 1-9: 0

## 2018-01-30 ASSESSMENT — ANXIETY QUESTIONNAIRES: GAD7 TOTAL SCORE: 0

## 2018-02-07 ENCOUNTER — DOCUMENTATION ONLY (OUTPATIENT)
Dept: FAMILY MEDICINE | Facility: OTHER | Age: 53
End: 2018-02-07

## 2018-02-13 NOTE — TELEPHONE ENCOUNTER
Patient Information     Patient Name MRN Sex Jus Krishnamurthy [id number] Male 1965      Telephone Encounter by Danyel Castellano MD at 2018  3:56 PM     Author:  Danyel Castellano MD Service:  (none) Author Type:  Physician     Filed:  2018  3:56 PM Encounter Date:  2018 Status:  Signed     :  Danyel Castellano MD (Physician)             -- Needs diabetes OV.    Signed, Danyel Castellano MD  Internal Medicine & Pediatrics

## 2018-02-13 NOTE — TELEPHONE ENCOUNTER
Patient Information     Patient Name MRN Sex Jus Krishnamurthy [id number] Male 1965      Telephone Encounter by Ana Campbell RN at 2018  2:57 PM     Author:  Ana Campbell RN Service:  (none) Author Type:  NURS- Registered Nurse     Filed:  2018  3:09 PM Encounter Date:  2018 Status:  Signed     :  Ana Campbell RN (NURS- Registered Nurse)            Pharmacy is requesting fill of Lantus. Patient has not followed up with Danyel Castellano MD for labs or diabetic check as requested - was No show in November. Patient has been seeing provider at different clinic for opoid dependence, not diabetes. Sent to Danyel Castellano MD for consideration of request.   Unable to complete prescription refill per RN Medication Refill Policy.................... Ana Campbell RN ....................  2018   3:02 PM    This is a Refill request from: [facility]  Medication: insulin glargine (LANTUS SOLOSTAR) 100 unit/mL (3 mL) pen    Qty:1 box       Ref:11  Start:2017   End:              Route:Subcutaneous          TOPEHR:No   Class:eRx    Sig:Inject 30 Units subcutaneous before bedtime.    Quantity requested: 3 mL X 11 refills  Last fill date: 18  Due for refill: yes  Last visit with DANYEL CASTELLANO was on: 2017 in [facility]  PCP:  Danyel Castellano MD  Diagnosis r/t this medication request: Diabetes type 2

## 2018-02-13 NOTE — TELEPHONE ENCOUNTER
Patient Information     Patient Name MRN Jus Diaz [id number] Male 1965      Telephone Encounter by Radha Pruitt RN at 2018 11:52 AM     Author:  Radha Pruitt RN Service:  (none) Author Type:  NURS- Registered Nurse     Filed:  2018 11:55 AM Encounter Date:  2018 Status:  Signed     :  Radha Pruitt RN (NURS- Registered Nurse)            zolpidem (AMBIEN) 10 mg tablet  TAKE 1 TABLET BY MOUTH DAILY AT BEDTIME AS NEEDED FOR SLEEP  Disp: 90 tablet Refills:     Class: eRx Start: 2018    To be filled at: [facility], - [place], [place] [address] [phone]  Medication noted as discontinued on 17 by Dr. Castellano.  RADHA PRUITT, RN ....................  2018   11:54 AM

## 2018-03-10 NOTE — TELEPHONE ENCOUNTER
Patient Information     Patient Name MRN Jus Diaz [id number] Male 1965      Telephone Encounter by Sandra Matos at 2017 10:04 AM     Author:  Sandra Matos Service:  (none) Author Type:  (none)     Filed:  2017 10:09 AM Encounter Date:  2017 Status:  Signed     :  Sandra Matos            Patient notified that needs to be seen.  Appt made for Wednesday.  Sandra Matos LPN ....................  2017   10:09 AM           last 6 years

## 2018-03-17 ENCOUNTER — TRANSFERRED RECORDS (OUTPATIENT)
Dept: HEALTH INFORMATION MANAGEMENT | Facility: OTHER | Age: 53
End: 2018-03-17

## 2018-03-20 ENCOUNTER — TRANSFERRED RECORDS (OUTPATIENT)
Dept: HEALTH INFORMATION MANAGEMENT | Facility: OTHER | Age: 53
End: 2018-03-20

## 2018-03-23 ENCOUNTER — TRANSFERRED RECORDS (OUTPATIENT)
Dept: HEALTH INFORMATION MANAGEMENT | Facility: OTHER | Age: 53
End: 2018-03-23

## 2018-04-03 ENCOUNTER — TRANSFERRED RECORDS (OUTPATIENT)
Dept: HEALTH INFORMATION MANAGEMENT | Facility: OTHER | Age: 53
End: 2018-04-03

## 2018-04-09 ENCOUNTER — TRANSFERRED RECORDS (OUTPATIENT)
Dept: HEALTH INFORMATION MANAGEMENT | Facility: OTHER | Age: 53
End: 2018-04-09

## 2018-04-19 ENCOUNTER — TRANSFERRED RECORDS (OUTPATIENT)
Dept: HEALTH INFORMATION MANAGEMENT | Facility: OTHER | Age: 53
End: 2018-04-19

## 2018-04-20 DIAGNOSIS — G47.00 INSOMNIA, PERSISTENT: Primary | ICD-10-CM

## 2018-04-24 RX ORDER — ZOLPIDEM TARTRATE 10 MG/1
TABLET ORAL
Qty: 90 TABLET | OUTPATIENT
Start: 2018-04-24

## 2018-04-24 NOTE — TELEPHONE ENCOUNTER
This is a Refill request from: [facility]  Name of Medication: zolpidem (AMBIEN) 10 MG tablet  TAKE 1 TABLET BY MOUTH DAILY AT BEDTIME AS NEEDED FOR SLEEP  Quantity requested: 90  Last fill date: not on request, not on current med list  Due for refill: unsure  PCP:  Danyel Castellano  Controlled Substance Agreement:  na   Diagnosis r/t this medication request: chronic insomnia    Not on current med list per epic chart review was DC'd in [place] on 5[place]/17     Unable to completeprescription refill per RN Medication Refill Policy.................... Helena Snider.................  4/24/2018   3:57 PM

## 2018-05-07 ENCOUNTER — TRANSFERRED RECORDS (OUTPATIENT)
Dept: HEALTH INFORMATION MANAGEMENT | Facility: OTHER | Age: 53
End: 2018-05-07

## 2018-07-23 NOTE — PROGRESS NOTES
Patient Information     Patient Name  Jus Edwards MRN  [id number] Sex  Male   1965      Letter by Danyel Castellano MD at      Author:  Danyel Castellano MD Service:  (none) Author Type:  (none)    Filed:   Encounter Date:  2017 Status:  (Other)           Jus Edwards  [address] [facility] [address]          May 24, 2017    Dear Mr. Edwards:      Unfortunately your A1c is even higher with an average blood sugar in the 300s. Please start checking your blood sugars before meals, and before bedtime. Keep a written log on any no focal or paper. Come see me in 1-2 weeks for a diabetes visit.    Results for orders placed or performed in visit on 17      HEMOGLOBIN A1C MONITORING (POCT)      Result  Value Ref Range    HEMOGLOBIN A1C MONITORING (POCT) 12.9 (H) 4.0 - 6.2 %    ESTIMATED AVERAGE GLUCOSE  324 mg/dL   LIPID PANEL      Result  Value Ref Range    CHOLESTEROL,TOTAL 191 <200 mg/dL    TRIGLYCERIDES 139 <150 mg/dL    HDL CHOLESTEROL 47 23 - 92 mg/dL    NON-HDL CHOLESTEROL 144 <145 mg/dl    CHOL/HDL RATIO            4.06 <4.50                    LDL CHOLESTEROL 116 (H) <100 mg/dL    PATIENT STATUS            NON-FASTING                   BASIC METABOLIC PANEL      Result  Value Ref Range    SODIUM 130 (L) 133 - 143 mmol/L    POTASSIUM 4.6 3.5 - 5.1 mmol/L    CHLORIDE 99 98 - 107 mmol/L    CO2,TOTAL 25 21 - 31 mmol/L    ANION GAP 6 5 - 18                    GLUCOSE 329 (H) 70 - 105 mg/dL    CALCIUM 9.4 8.6 - 10.3 mg/dL    BUN 19 7 - 25 mg/dL    CREATININE 0.88 0.70 - 1.30 mg/dL    BUN/CREAT RATIO           22                    GFR if African American >60 >60 ml/min/1.73m2    GFR if not African American >60 >60 ml/min/1.73m2     If you have any questions or concerns, please call the clinic at [phone].    Signed, Danyel Castellano MD  Internal Medicine & Pediatrics        Greetings, Dr. Castellano here.  I'd like to ask you to reconsider if MyChart would be right for you.  If  you're not comfortable using a computer or smart phone, disregard this message and you won't hurt my feelings.    [facility] is an easier and more secure way for us to communicate with each other.  With [facility], you can quickly and easily view your health information and appointments at your clinic at any time and anywhere you have Internet access.  We even have secure access via your iPhone, iPad or other smart phone.  To learn more about [facility], please go to [email or web address]    To get started, you will need:     the [facility] web site ([email or web address])    your [facility] activation code:  [email or web address]    Expires: 2017  1:11 PM    the last 4 digits of your Social Security number (SSN)    your date of birth.      Remember to activate your account quickly -   Your activation code expires in 45 days!    In the event you have technical difficulties, please call   [facility] at [phone].

## 2018-07-24 NOTE — PROGRESS NOTES
Patient Information     Patient Name  Jus Edwards MRN  [id number] Sex  Male   1965      Letter by Danyel Castellano MD at      Author:  Danyel Castellano MD Service:  (none) Author Type:  (none)    Filed:   Encounter Date:  3/25/2017 Status:  (Other)           Jus Edwards  [address] [facility] [address]          2017    Dear Mr. Edwards:    Your health care provider reviews your medical record on a regular basis. This helps to ensure that you get the preventive care you need for your diabetes. Good preventive care can help you reduce your risk of serious health problems related to diabetes.     Your preventive care includes a blood test called hemoglobin A1c done at least twice a year. The usual goal for this test is a result below 7 percent. Your medical record indicates it is time to have this test again.     Your last Hemoglobin A1c result is:  Lab Results      Component  Value Date/Time    HGBA1C 11.6 (H) 10/26/2016 11:34 AM    HGBA1C 9.6 (H) 2011 12:10 PM     Why A1c tests are important  This test helps your provider monitor your diabetes by measuring the amount of glucose (sugar) in your blood over the last three months. It shows how well your diabetes is being controlled. Good control of your diabetes can help you avoid complications. It can also help you feel better and give you more energy.     Your A1c goal  As part of your diabetes treatment plan, you and your provider set a goal for your A1c test results. Lowering your A1c number can greatly reduce your risk of serious health problems in the future. These risks include stroke, heart attack, kidney disease and eye problems. An A1c test result higher than 8 percent can be a sign that your treatment plan needs adjusting.    Scheduling an A1c test  You are encouraged to call the clinic and schedule an appointment with your health care provider.  Please mention this letter when you call.  You do not have to  fast for this test.    If you have already made an appointment, please disregard this letter. If you no longer receive your diabetes care at this clinic, please call and let us know. Thank you.    Sincerely,  [facility]

## 2018-07-27 NOTE — TELEPHONE ENCOUNTER
[facility] is requesting an Rx for the following:    insulin aspart (NOVOLOG PEN) 100 UNIT/ML injection  Short Acting Insulin Protocol Failed 10:12 AM   Blood pressure less than 140/90 in past 6 months    LDL on file in past 12 months    Microalbumin on file in past 12 months    Serum creatinine on file in past 12 months    HgbA1C in past 3 or 6 months    Recent (6 mo) or future (30 days) visit within the authorizing provider's specialty     Last OV 17. Patient overdue for annual exam.    Medication status unclear:    Per Chart Review: Medication tab: Medication is Patient reported    Per [facility]: Medication disontinued 17    Per Snapshot: Medication on current outpatient medication list    Per [facility]: Medication was discontinued 9/15/17; reason: reorder.   insulin aspart (NOVOLOG FLEXPEN) 100 unit/mL solution for injection (Discontinued) 5 pen 5 2017 9/15/2017 No   Si units with breakfast, 30 units with lunch and 28 units with dinner.   Class: eRx   Notes to Pharmacy: Correction: 150-200 2 units, 201-250 4 units, 251-300 6 units, 301-350 8 units, 351-400 10 units, over 401 call MD   Reason for Discontinue: Reorder   E-Prescribing Status: Receipt confirmed by pharmacy (2017 10:00 AM CDT)     Per [facility]: Medication was discontinued 17; reason:  Error.  NOVOLOG FLEXPEN 100 unit/mL solution for injection (Discontinued) 15 pen 4 2017 No   Si UNITS WITH BREAKFAST  25 UNITS WITH LUNCH AND  DINNER   Class: eRx   Route: Subcutaneous   Reason for Discontinue:  Error   E-Prescribing Status: Receipt confirmed by pharmacy (2017 11:55 AM CDT)     Attempted reaching Patient to verify whether he is currently taking Novolog and to assist him in scheduling appointment for annual visit and labs. Left message on machine to call back. Ana Joy RN .............. 2018  4:20 PM

## 2018-07-30 NOTE — TELEPHONE ENCOUNTER
Attempted to contact patient and no answer, message was already left and no return call.    Per system looks like patient is seeing another outside provider.   Call and spoke with [facility] and they state is does look he has another provider and they will sent Rx over the them.    Radha Pruitt RN on 7/30/2018 at 2:11 PM

## 2018-08-24 RX ORDER — FLURBIPROFEN SODIUM 0.3 MG/ML
SOLUTION/ DROPS OPHTHALMIC
Qty: 300 EACH | Refills: 2 | OUTPATIENT
Start: 2018-08-24

## 2018-08-24 NOTE — TELEPHONE ENCOUNTER
Patient has not been seen since 5/24/17.  Attempted to contact patient to verify if new PCP and no answer.  Called [facility] and they have another provider noted and will sent to them.    Radha Pruitt RN on 8/24/2018 at 12:34 PM

## 2018-10-07 ENCOUNTER — TRANSFERRED RECORDS (OUTPATIENT)
Dept: HEALTH INFORMATION MANAGEMENT | Facility: OTHER | Age: 53
End: 2018-10-07

## 2018-10-11 ENCOUNTER — TRANSFERRED RECORDS (OUTPATIENT)
Dept: HEALTH INFORMATION MANAGEMENT | Facility: OTHER | Age: 53
End: 2018-10-11

## 2018-10-16 ENCOUNTER — TRANSFERRED RECORDS (OUTPATIENT)
Dept: HEALTH INFORMATION MANAGEMENT | Facility: OTHER | Age: 53
End: 2018-10-16

## 2018-10-23 ENCOUNTER — TELEPHONE (OUTPATIENT)
Dept: PEDIATRICS | Facility: OTHER | Age: 53
End: 2018-10-23

## 2018-10-23 NOTE — TELEPHONE ENCOUNTER
States that pt has been canceling or postponing PT and won't return calls or answer the phone. Would like KP to know that they have not seen the pt yet

## 2018-11-07 ENCOUNTER — TRANSFERRED RECORDS (OUTPATIENT)
Dept: HEALTH INFORMATION MANAGEMENT | Facility: OTHER | Age: 53
End: 2018-11-07

## 2018-11-13 ENCOUNTER — TRANSFERRED RECORDS (OUTPATIENT)
Dept: HEALTH INFORMATION MANAGEMENT | Facility: OTHER | Age: 53
End: 2018-11-13

## 2024-04-15 ENCOUNTER — TRANSFERRED RECORDS (OUTPATIENT)
Dept: HEALTH INFORMATION MANAGEMENT | Facility: OTHER | Age: 59
End: 2024-04-15

## 2024-04-15 LAB — EJECTION FRACTION: 55 %
